# Patient Record
Sex: MALE | Race: WHITE | Employment: OTHER | URBAN - METROPOLITAN AREA
[De-identification: names, ages, dates, MRNs, and addresses within clinical notes are randomized per-mention and may not be internally consistent; named-entity substitution may affect disease eponyms.]

---

## 2018-10-15 ENCOUNTER — HOSPITAL ENCOUNTER (EMERGENCY)
Age: 68
Discharge: HOME | End: 2018-10-15
Attending: EMERGENCY MEDICINE
Payer: MEDICARE

## 2018-10-15 VITALS
TEMPERATURE: 97.4 F | BODY MASS INDEX: 45.58 KG/M2 | RESPIRATION RATE: 16 BRPM | HEART RATE: 76 BPM | WEIGHT: 291 LBS | DIASTOLIC BLOOD PRESSURE: 84 MMHG | SYSTOLIC BLOOD PRESSURE: 142 MMHG | OXYGEN SATURATION: 100 %

## 2018-10-15 DIAGNOSIS — M79.605 BILATERAL LEG PAIN: Primary | ICD-10-CM

## 2018-10-15 DIAGNOSIS — M79.604 BILATERAL LEG PAIN: Primary | ICD-10-CM

## 2018-10-15 PROCEDURE — 99282 EMERGENCY DEPT VISIT SF MDM: CPT

## 2018-10-15 PROCEDURE — 93970 EXTREMITY STUDY: CPT

## 2018-10-15 NOTE — PROCEDURES
[address]  *** FINAL REPORT ***    Name: Kenny Morfin  MRN: [id number]    Outpatient  : 1950  HIS Order #: [id number]  [address] [facility] Visit #: [id number]  Date: 15 Oct 2018    TYPE OF TEST: Peripheral Venous Testing    REASON FOR TEST  Pain in limb    Right Leg:-  Deep venous thrombosis:           No  Superficial venous thrombosis:    No  Deep venous insufficiency:        No  Superficial venous insufficiency: No    Left Leg:-  Deep venous thrombosis:           No  Superficial venous thrombosis:    No  Deep venous insufficiency:        No  Superficial venous insufficiency: No      INTERPRETATION/FINDINGS  PROCEDURE:  BILATERAL LE VENOUS DUPLEX. Evaluation of lower extremity veins with ultrasound (B-mode imaging,  pulsed Doppler, color Doppler). Includes the common femoral, deep  femoral, femoral, popliteal, posterior tibial, peroneal, and great  saphenous veins. FINDINGS:  Stephanie  scale and color flow duplex images of the veins in  both lower extremities demonstrate normal compressibility, spontaneous   and augmented flow profiles, and absence of filling defects  throughout the deep and superficial veins in both lower extremities. CONCLUSION: Bilateral lower extremity venous duplex negative for deep  venous thrombosis or thrombophlebitis. ADDITIONAL COMMENTS    I have personally reviewed the data relevant to the interpretation of  this  study. TECHNOLOGIST: Jennifer Barillas. Mika  Signed: 10/15/2018 12:03 PM    PHYSICIAN: Heidi Blanton.  Mary Grace Moralez MD  Signed: 10/16/2018 04:08 PM

## 2018-10-15 NOTE — DISCHARGE INSTRUCTIONS
Leg Pain: Care Instructions  Your Care Instructions  Many things can cause leg pain. Too much exercise or overuse can cause a muscle cramp (or charley horse). You can get leg cramps from not eating a balanced diet that has enough potassium, calcium, and other minerals. If you do not drink enough fluids or are taking certain medicines, you may develop leg cramps. Other causes of leg pain include injuries, blood flow problems, nerve damage, and twisted and enlarged veins (varicose veins). You can usually ease pain with self-care. Your doctor may recommend that you rest your leg and keep it elevated. Follow-up care is a key part of your treatment and safety. Be sure to make and go to all appointments, and call your doctor if you are having problems. It's also a good idea to know your test results and keep a list of the medicines you take. How can you care for yourself at home? · Take pain medicines exactly as directed. ¨ If the doctor gave you a prescription medicine for pain, take it as prescribed. ¨ If you are not taking a prescription pain medicine, ask your doctor if you can take an over-the-counter medicine. · Take any other medicines exactly as prescribed. Call your doctor if you think you are having a problem with your medicine. · Rest your leg while you have pain, and avoid standing for long periods of time. · Prop up your leg at or above the level of your heart when possible. · Make sure you are eating a balanced diet that is rich in calcium, potassium, and magnesium, especially if you are pregnant. · If directed by your doctor, put ice or a cold pack on the area for 10 to 20 minutes at a time. Put a thin cloth between the ice and your skin. · Your leg may be in a splint, a brace, or an elastic bandage, and you may have crutches to help you walk. Follow your doctor's directions about how long to wear supports and how to use the crutches. When should you call for help?   Call 911 anytime you think you may need emergency care. For example, call if:    · You have sudden chest pain and shortness of breath, or you cough up blood.     · Your leg is cool or pale or changes color.    Call your doctor now or seek immediate medical care if:    · You have increasing or severe pain.     · Your leg suddenly feels weak and you cannot move it.     · You have signs of a blood clot, such as:  ¨ Pain in your calf, back of the knee, thigh, or groin. ¨ Redness and swelling in your leg or groin.     · You have signs of infection, such as:  ¨ Increased pain, swelling, warmth, or redness. ¨ Red streaks leading from the sore area. ¨ Pus draining from a place on your leg. ¨ A fever.     · You cannot bear weight on your leg.    Watch closely for changes in your health, and be sure to contact your doctor if:    · You do not get better as expected. Where can you learn more? Go to http://barak-sara.info/. Enter U619 in the search box to learn more about \"Leg Pain: Care Instructions. \"  Current as of: November 20, 2017  Content Version: 11.8  © 0440-0231 [employer]. Care instructions adapted under license by [employer] (which disclaims liability or warranty for this information). If you have questions about a medical condition or this instruction, always ask your healthcare professional. [address] any warranty or liability for your use of this information. We hope that we have addressed all of your medical concerns. The examination and treatment you received in the Emergency Department were for an emergent problem and were not intended as complete care. It is important that you follow up with your healthcare provider(s) for ongoing care. If your symptoms worsen or do not improve as expected, and you are unable to reach your usual health care provider(s), you should return to the Emergency Department.       Today's healthcare is undergoing tremendous change, and patient satisfaction surveys are one of the many tools to assess the quality of medical care. You may receive a survey from the [employer] regarding your experience in the Emergency Department. I hope that your experience has been completely positive, particularly the medical care that I provided. As such, please participate in the survey; anything less than excellent does not meet my expectations or intentions. [facility] and [facility] participate in nationally recognized quality of care measures. If your blood pressure is greater than 120/80, as reported below, we urge that you seek medical care to address the potential of high blood pressure, commonly known as hypertension. Hypertension can be hereditary or can be caused by certain medical conditions, pain, stress, or \"white coat syndrome. \"       Please make an appointment with your health care provider(s) for follow up of your Emergency Department visit. VITALS:   Patient Vitals for the past 8 hrs:   Temp Pulse Resp BP SpO2   10/15/18 1054 97.4 °F (36.3 °C) 76 16 142/84 100 %          Thank you for allowing us to provide you with medical care today. We realize that you have many choices for your emergency care needs. Please choose us in the future for any continued health care needs. John Newman, [address]: [phone]            No results found for this or any previous visit (from the past 24 hour(s)). No results found.

## 2018-10-15 NOTE — ED NOTES
Discharge paperwork given to pt. Pt verbalizes understanding, denies any questions and concerns at this time.

## 2018-10-15 NOTE — ED PROVIDER NOTES
Patient is a 80 y.o. male presenting with leg pain. Leg Pain Pertinent negatives include no numbness. 81 yo WM presents with b/l leg pain onset a few weeks ago. Recent long travel in car. No fever, chills, back pain, cp, sob, bowel/bladder incontinence or saddle anesthesia, weakness or numbness. Hx of phlebitis. Past Medical History:  
Diagnosis Date  AR (allergic rhinitis) Dr. Praveen Jackson  Back pain 6/1/09  
 at work  Rinaldi's esophagus Dr. Cardell Olszewski, last EGD10/09 metaplasia, no dysplasia  DDD (degenerative disc disease), cervical   
 MRI 6/2005 C5-C6 broad central bulge  ED (erectile dysfunction)  Food allergy  HTN (hypertension) 2008  
 cardiac cath 1/2008 Dr. Chantale Nichols  Insomnia  Internal carotid artery stenosis 2/2006  
  left prox ICA 60-79%, mild 8/2011  Iron deficiency 2005  
  hx gastric bypass, Rinaldi's  Migraine Dr. Mamta Ma.  OA (osteoarthritis) of knee Dr. Ramandeep Bone, synvisc  Obesity   
 gastric bypass, peak wt 385 lb  JERONIMO (obstructive sleep apnea)  PUD (peptic ulcer disease) bleed 1980s  Raynaud phenomenon  S/P gastric bypass  Sensorineural hearing loss, asymmetrical 8/2007  
 left  Subjective visual disturbance   
 due to Migraine, Dr. Zach Munoz  Trigeminal neuralgia syndrome 2008 ? Migraine  Vertigo Dr. Jang Ou. Migraine-related?  Vitamin D deficiency Past Surgical History:  
Procedure Laterality Date  COLONOSCOPY  ~2007  COLONOSCOPY  10/23/09  
 normal  
 DRAINAGE OF DEEP RECTAL ABSCESS    
 x2  
 EGD  10/2009 [facility] Dr. Malcolm Kwok  HX HEART CATHETERIZATION  1/2008 Dr. Meliza Cifuentes, minimal dz  
[address]   
 twisted bowel Family History:  
Problem Relation Age of Onset  Dementia Mother  Cancer Mother   
  skin  Heart Disease Father CHF age 45s, heavy etoh  Heart Attack Father  Colon Cancer Other   
  maternal uncle Social History Social History  Marital status:  Spouse name: Alen Osorio  Number of children: 2  
 Years of education: N/A Occupational History  [employer] Social History Main Topics  Smoking status: Never Smoker  Smokeless tobacco: Never Used  Alcohol use No  
 Drug use: No  
 Sexual activity: Not on file Other Topics Concern  Not on file Social History Narrative ALLERGIES: Amlodipine; Fd and c blue no.1; Lyrica [pregabalin]; Potassium; and Prednisone Review of Systems Constitutional: Negative for chills and fever. Respiratory: Negative for cough and shortness of breath. Cardiovascular: Negative for chest pain, palpitations and leg swelling. Gastrointestinal: Negative for abdominal pain, diarrhea, nausea and vomiting. Neurological: Negative for weakness and numbness. All other systems reviewed and are negative. Vitals:  
 10/15/18 1054 BP: 142/84 Pulse: 76 Resp: 16 Temp: 97.4 °F (36.3 °C) SpO2: 100% Weight: 132 kg (291 lb) Physical Exam  
Physical Examination: General appearance - alert, well appearing, and in no distress, oriented to person, place, and time and normal appearing weight Eyes - pupils equal and reactive, extraocular eye movements intact Neck - supple, no significant adenopathy Chest - clear to auscultation, no wheezes, rales or rhonchi, symmetric air entry Heart - normal rate, regular rhythm, normal S1, S2, no murmurs, rubs, clicks or gallops Abdomen - soft, nontender, nondistended, no masses or organomegaly Back exam - full range of motion, no tenderness, palpable spasm or pain on motion Neurological - alert, oriented, normal speech, no focal findings or movement disorder noted Musculoskeletal - no joint tenderness, deformity or swelling Extremities - peripheral pulses normal, no pedal edema, no clubbing or cyanosis Skin - normal coloration and turgor, no rashes, no suspicious skin lesions noted MDM Number of Diagnoses or Management Options Bilateral leg pain:  
  
Amount and/or Complexity of Data Reviewed Tests in the radiology section of CPT®: ordered and reviewed Independent visualization of images, tracings, or specimens: yes Patient Progress Patient progress: stable ED Course Procedures No DVT, VSS, will d/c with pcp f/u.

## 2018-10-15 NOTE — ED TRIAGE NOTES
Pt arrives with cc of bilateral lower extremity pain but especially of LLE starting approximately 2.5 weeks ago. No redness, swelling, or warmth noted. Pt with hx of phlebitis. Drove from [place] in the past 5 weeks but made frequent stops; denies smoking.